# Patient Record
Sex: MALE | URBAN - METROPOLITAN AREA
[De-identification: names, ages, dates, MRNs, and addresses within clinical notes are randomized per-mention and may not be internally consistent; named-entity substitution may affect disease eponyms.]

---

## 2024-09-27 ENCOUNTER — OFFICE VISIT (OUTPATIENT)
Dept: URGENT CARE | Age: 29
End: 2024-09-27
Payer: COMMERCIAL

## 2024-09-27 VITALS
TEMPERATURE: 98 F | OXYGEN SATURATION: 98 % | WEIGHT: 175 LBS | HEIGHT: 69 IN | SYSTOLIC BLOOD PRESSURE: 132 MMHG | HEART RATE: 69 BPM | DIASTOLIC BLOOD PRESSURE: 84 MMHG | BODY MASS INDEX: 25.92 KG/M2

## 2024-09-27 DIAGNOSIS — S61.211A LACERATION OF LEFT INDEX FINGER WITHOUT FOREIGN BODY WITHOUT DAMAGE TO NAIL, INITIAL ENCOUNTER: Primary | ICD-10-CM

## 2024-09-27 NOTE — PROGRESS NOTES
"Subjective   Patient ID: Efraín Weinstein is a 29 y.o. male. They present today with a chief complaint of Injury (Sliced finger on left hand while cutting. Up to date on tdap. ).    History of Present Illness  Patient is a very pleasant 29-year-old white male, no significant past medical history, presented to clinic for chief complaint of finger laceration.  Patient states he was cutting a frozen bagel just prior to arrival when he sustained a laceration to the left index finger.  He is unsure if he needed stitches therefore reported to clinic for further evaluation.  He denies any numbness or tingling in the finger.  States he was able to control bleeding prior to arrival with pressure.  Tetanus is up-to-date.  No other injuries and no further complaints.      Injury      Past Medical History  Allergies as of 09/27/2024    (No Known Allergies)       (Not in a hospital admission)         History reviewed. No pertinent past medical history.    History reviewed. No pertinent surgical history.     reports that he has never smoked. He has never been exposed to tobacco smoke. He has never used smokeless tobacco. He reports current alcohol use. He reports that he does not currently use drugs after having used the following drugs: Marijuana.    Review of Systems  Review of Systems       All review of systems negative unless stated in HPI.                         Objective    Vitals:    09/27/24 1146   BP: 132/84   BP Location: Left arm   Patient Position: Sitting   BP Cuff Size: Adult   Pulse: 69   Temp: 36.7 °C (98 °F)   TempSrc: Oral   SpO2: 98%   Weight: 79.4 kg (175 lb)   Height: 1.753 m (5' 9\")     No LMP for male patient.    Physical Exam  General: Vitals Noted. No distress. Normocephalic.     HEENT: TMs normal, EOMI, normal conjunctiva, patent nares, Normal OP    Neck: Supple with no adenopathy.     Cardiac: Regular Rate and Rhythm. No murmur.     Pulmonary: Equal breath sounds bilaterally. No wheezes, rhonchi, or " rales.    Abdomen: Soft, non-tender, with normal bowel sounds.     Musculoskeletal: Moves all extremities, no effusion, no edema.     Skin: There is an approximately 1 cm superficial linear laceration over the radial aspect of the left index finger over the middle proximal phalanx.  There is no active bleeding.  No involvement of underlying structures.  No foreign body.  Is neurovascular intact distally with cap refill less than 2 seconds full sensation and strength.  Procedures    Point of Care Test & Imaging Results from this visit    No results found.    Diagnostic study results (if any) were reviewed by Waylon Woods PA-C.    Assessment/Plan   Allergies, medications, history, and pertinent labs/EKGs/Imaging reviewed by Waylon Woods PA-C.     Medical Decision Making  Patient was seen eval in the clinic with complaint of finger laceration.  On exam patient is nontoxic well-appearing resting comfortably no acute distress.  Vital signs are stable, afebrile.  Chest is clear, it is regular, belly is soft and nontender.  Evaluation of laceration as above overall very superficial in nature.  I had a conversation with the patient regarding my impression that I feel is reasonable to repair with sutures or just dressed with bacitracin and Band-Aid.  Patient is opted to dressed with bacitracin and Band-Aid.  I reviewed wound care at home with the patient including daily dressing changes and cleansing with warm soapy water.  As noted tetanus is up-to-date.  He will be discharged home at this time with instruction follow-up with primary care physician in the next week.  I reviewed my impression, plan, strict return precautions with the patient.  He expresses understanding and agreement plan of care.    Orders and Diagnoses  There are no diagnoses linked to this encounter.      Medical Admin Record      Follow Up Instructions  No follow-ups on file.    Patient disposition: Home    Electronically signed by  Waylon Woods PA-C  11:51 AM